# Patient Record
Sex: FEMALE | Race: WHITE | NOT HISPANIC OR LATINO | Employment: FULL TIME | URBAN - METROPOLITAN AREA
[De-identification: names, ages, dates, MRNs, and addresses within clinical notes are randomized per-mention and may not be internally consistent; named-entity substitution may affect disease eponyms.]

---

## 2020-02-02 ENCOUNTER — HOSPITAL ENCOUNTER (EMERGENCY)
Facility: HOSPITAL | Age: 25
Discharge: HOME | End: 2020-02-02
Attending: EMERGENCY MEDICINE
Payer: COMMERCIAL

## 2020-02-02 VITALS
DIASTOLIC BLOOD PRESSURE: 100 MMHG | TEMPERATURE: 99 F | SYSTOLIC BLOOD PRESSURE: 168 MMHG | RESPIRATION RATE: 18 BRPM | WEIGHT: 263.56 LBS | HEART RATE: 108 BPM | OXYGEN SATURATION: 100 %

## 2020-02-02 DIAGNOSIS — N76.0 ACUTE VAGINITIS: Primary | ICD-10-CM

## 2020-02-02 DIAGNOSIS — N89.8 VAGINAL DISCHARGE: ICD-10-CM

## 2020-02-02 DIAGNOSIS — R03.0 ELEVATED BLOOD PRESSURE READING: ICD-10-CM

## 2020-02-02 DIAGNOSIS — R10.2 PELVIC PAIN: ICD-10-CM

## 2020-02-02 DIAGNOSIS — Z32.02 NEGATIVE PREGNANCY TEST: ICD-10-CM

## 2020-02-02 LAB
B-HCG UR QL: NEGATIVE
BACTERIA GENITAL QL WET PREP: ABNORMAL
BILIRUB UR QL STRIP: NEGATIVE
CLARITY UR: CLEAR
CLUE CELLS VAG QL WET PREP: ABNORMAL
COLOR UR: YELLOW
FILAMENT FUNGI VAG WET PREP-#/AREA: ABNORMAL
GLUCOSE UR QL STRIP: NEGATIVE
HGB UR QL STRIP: NEGATIVE
KETONES UR QL STRIP: NEGATIVE
LEUKOCYTE ESTERASE UR QL STRIP: NEGATIVE
NITRITE UR QL STRIP: NEGATIVE
PH UR STRIP: 7 [PH] (ref 5–8)
PROT UR QL STRIP: NEGATIVE
SP GR UR STRIP: 1.02 (ref 1–1.03)
SPECIMEN SOURCE: ABNORMAL
T VAGINALIS GENITAL QL WET PREP: ABNORMAL
URN SPEC COLLECT METH UR: NORMAL
UROBILINOGEN UR STRIP-ACNC: NEGATIVE EU/DL
WBC #/AREA VAG WET PREP: ABNORMAL
YEAST GENITAL QL WET PREP: ABNORMAL

## 2020-02-02 PROCEDURE — 99284 EMERGENCY DEPT VISIT MOD MDM: CPT

## 2020-02-02 PROCEDURE — 81003 URINALYSIS AUTO W/O SCOPE: CPT

## 2020-02-02 PROCEDURE — 81025 URINE PREGNANCY TEST: CPT

## 2020-02-02 PROCEDURE — 87210 SMEAR WET MOUNT SALINE/INK: CPT

## 2020-02-02 RX ORDER — CLINDAMYCIN HYDROCHLORIDE 300 MG/1
300 CAPSULE ORAL 3 TIMES DAILY
Qty: 30 CAPSULE | Refills: 0 | Status: SHIPPED | OUTPATIENT
Start: 2020-02-02 | End: 2020-02-12

## 2020-02-03 NOTE — ED PROVIDER NOTES
SCRIBE #1 NOTE: I, Mac Diaz, am scribing for, and in the presence of, JEAN Hinkle. I have scribed the entire note.      History      Chief Complaint   Patient presents with    Pelvic Pain     pelvic pain began several days ago, pt unsure if pregnant       Review of patient's allergies indicates:   Allergen Reactions    Augmentin [amoxicillin-pot clavulanate]     Sulfa (sulfonamide antibiotics)         HPI   HPI    2/2/2020, 6:06 PM   History obtained from the patient      History of Present Illness: Dary Doss is a 24 y.o. female patient who presents to the Emergency Department for pelvic pain, onset 3 days PTA. Pt reports her last menstrual period was 1 week ago, but it lasted 8 days rather than the normal 5 days. Pt is unsure if she is pregnant. Symptoms are constant and moderate in severity. No mitigating or exacerbating factors reported. No associated sxs reported. Patient denies any dysuria, vaginal bleeding, vaginal discharge, fever, chills, nausea, vomiting, chest pain, SOB, headache, dizziness,  and all other sxs at this time. No prior Tx reported. No further complaints or concerns at this time.         Arrival mode: Personal vehicle      PCP: LILLIE Wiley       Past Medical History:  History reviewed. No pertinent medical history.    Past Surgical History:  History reviewed. No pertinent surgical history.    Family History:  History reviewed. No pertinent family history.    Social History:  Social History Main Topics    Smoking status: Unknown if ever smoked    Smokeless tobacco: Unknown if ever used    Alcohol Use: Unknown drinking history    Drug Use: Unknown if ever used    Sexual Activity: Unknown       ROS   Review of Systems   Constitutional: Negative for chills and fever.   HENT: Negative for sore throat.    Respiratory: Negative for shortness of breath.    Cardiovascular: Negative for chest pain.   Gastrointestinal: Negative for abdominal pain, diarrhea, nausea  and vomiting.   Genitourinary: Positive for pelvic pain and vaginal pain. Negative for dysuria, vaginal bleeding and vaginal discharge.   Musculoskeletal: Negative for back pain.   Skin: Negative for rash.   Neurological: Negative for weakness, numbness and headaches.   Hematological: Does not bruise/bleed easily.   All other systems reviewed and are negative.      Physical Exam      Initial Vitals [02/02/20 1720]   BP Pulse Resp Temp SpO2   (!) 168/100 (!) 120 18 98.5 °F (36.9 °C) 100 %      MAP       --          Physical Exam  Nursing Notes and Vital Signs Reviewed.  Constitutional: Patient is in mild distress. Well-developed and well-nourished.  Head: Atraumatic. Normocephalic.  Eyes: PERRL. EOM intact. Conjunctivae are not pale. No scleral icterus.  ENT: Mucous membranes are moist. Oropharynx is clear and symmetric.    Neck: Supple. Full ROM. No lymphadenopathy.  Cardiovascular: Regular rate. Regular rhythm. No murmurs, rubs, or gallops. Distal pulses are 2+ and symmetric.  Pulmonary/Chest: No respiratory distress. Clear to auscultation bilaterally. No wheezing or rales.  Abdominal: Soft and non-distended.  There is no tenderness.  No rebound, guarding, or rigidity. Good bowel sounds.  Genitourinary: No CVA tenderness  Musculoskeletal: Moves all extremities. No obvious deformities. No edema. No calf tenderness.  Pelvic: A female chaperone was present for this examination. Nl external inspection. No lesions or abnormalities were visible on the labia majora or minora. Cervical os is closed. There is no CMT. There is no blood in the vaginal vault. White discharge noted. No adnexal tenderness. No adnexal masses.  Skin: Warm and dry.  Neurological:  Alert, awake, and appropriate.  Normal speech.  No acute focal neurological deficits are appreciated.  Psychiatric: Normal affect. Good eye contact. Appropriate in content.    ED Course    Procedures  ED Vital Signs:  Vitals:    02/02/20 1720 02/02/20 2148   BP: (!)  168/100    Pulse: (!) 120 108   Resp: 18    Temp: 98.5 °F (36.9 °C)    TempSrc: Oral    SpO2: 100%    Weight: 119.6 kg (263 lb 9 oz)        Abnormal Lab Results:  Labs Reviewed   VAGINAL SCREEN - Abnormal; Notable for the following components:       Result Value    WBC - Vaginal Screen Occasional (*)     Bacteria - Vaginal Screen Few (*)     All other components within normal limits   URINALYSIS, REFLEX TO URINE CULTURE    Narrative:     Preferred Collection Type->Urine, Clean Catch   PREGNANCY TEST, URINE RAPID        All Lab Results:  Results for orders placed or performed during the hospital encounter of 02/02/20   Urinalysis, Reflex to Urine Culture Urine, Clean Catch   Result Value Ref Range    Specimen UA Urine, Clean Catch     Color, UA Yellow Yellow, Straw, Ellen    Appearance, UA Clear Clear    pH, UA 7.0 5.0 - 8.0    Specific Gravity, UA 1.020 1.005 - 1.030    Protein, UA Negative Negative    Glucose, UA Negative Negative    Ketones, UA Negative Negative    Bilirubin (UA) Negative Negative    Occult Blood UA Negative Negative    Nitrite, UA Negative Negative    Urobilinogen, UA Negative <2.0 EU/dL    Leukocytes, UA Negative Negative   Pregnancy, urine rapid   Result Value Ref Range    Preg Test, Ur Negative    Vaginal Screen Vagina   Result Value Ref Range    Trichomonas None None    Clue Cells None None    Budding Yeast None None    Fungal Hyphae None None    WBC - Vaginal Screen Occasional (A) None    Bacteria - Vaginal Screen Few (A) None    Wet Prep Source Vagina          Imaging Results:  Imaging Results    None                 The Emergency Provider reviewed the vital signs and test results, which are outlined above.    ED Discussion     9:44 PM: Reassessed pt at this time.   Discussed with pt all pertinent ED information and results. Discussed pt dx and plan of tx. Gave pt all f/u and return to the ED instructions. All questions and concerns were addressed at this time. Pt expresses understanding of  information and instructions, and is comfortable with plan to discharge. Pt is stable for discharge.    I discussed with patient and/or family/caretaker that evaluation in the ED does not suggest any emergent or life threatening medical conditions requiring immediate intervention beyond what was provided in the ED, and I believe patient is safe for discharge.  Regardless, an unremarkable evaluation in the ED does not preclude the development or presence of a serious of life threatening condition. As such, patient was instructed to return immediately for any worsening or change in current symptoms.           ED Medication(s):  Medications - No data to display    Follow-up Information     LILLIE Wiley. Schedule an appointment as soon as possible for a visit in 3 days.    Specialty:  Family Medicine  Contact information:  [id number] [facility] [address]  [address]  [facility] ([place], [place]) [address] [phone]             OB/GYN. Schedule an appointment as soon as possible for a visit in 3 days.                 Discharge Medication List as of 2/2/2020  9:49 PM      START taking these medications    Details   clindamycin (CLEOCIN) 300 MG capsule Take 1 capsule (300 mg total) by mouth 3 (three) times daily. for 10 days, Starting Sun 2/2/2020, Until Wed 2/12/2020, Print             Medical Decision Making    Medical Decision Making:   Clinical Tests:   Lab Tests: Ordered and Reviewed     Additional MDM:   Hypertension: The patient has hypertension (no treatment required at this time). The patient's condition was felt to be stable.        Scribe Attestation:   Scribe #1: I performed the above scribed service and the documentation accurately describes the services I performed. I attest to the accuracy of the note.    Attending:   Physician Attestation Statement for Scribe #1: I,JEAN Hinkle , personally performed the services described in this documentation, as scribed by Mac Diaz, in my presence, and it is both accurate and  complete.          Clinical Impression       ICD-10-CM ICD-9-CM   1. Acute vaginitis N76.0 616.10   2. Vaginal discharge N89.8 623.5   3. Pelvic pain R10.2 [id number]   4. Negative pregnancy test Z32.02 V72.41   5. Elevated blood pressure reading R03.0 796.2       Disposition:   Disposition: Discharged  Condition: Stable         Natty Ward PA-C  02/06/20 1154